# Patient Record
Sex: FEMALE | Race: WHITE
[De-identification: names, ages, dates, MRNs, and addresses within clinical notes are randomized per-mention and may not be internally consistent; named-entity substitution may affect disease eponyms.]

---

## 2017-06-07 ENCOUNTER — HOSPITAL ENCOUNTER (OUTPATIENT)
Dept: HOSPITAL 80 - FIMAGING | Age: 52
End: 2017-06-07
Attending: OBSTETRICS & GYNECOLOGY
Payer: COMMERCIAL

## 2017-06-07 DIAGNOSIS — Z12.31: Primary | ICD-10-CM

## 2017-06-07 PROCEDURE — G0202 SCR MAMMO BI INCL CAD: HCPCS

## 2019-02-11 ENCOUNTER — HOSPITAL ENCOUNTER (OUTPATIENT)
Dept: HOSPITAL 80 - FIMAGING | Age: 54
End: 2019-02-11
Attending: OBSTETRICS & GYNECOLOGY
Payer: COMMERCIAL

## 2019-02-11 DIAGNOSIS — Z12.31: Primary | ICD-10-CM

## 2019-03-13 ENCOUNTER — HOSPITAL ENCOUNTER (OUTPATIENT)
Dept: HOSPITAL 80 - FCATH | Age: 54
Discharge: HOME | End: 2019-03-13
Attending: INTERNAL MEDICINE
Payer: COMMERCIAL

## 2019-03-13 DIAGNOSIS — I25.10: Primary | ICD-10-CM

## 2019-03-13 DIAGNOSIS — E10.9: ICD-10-CM

## 2019-03-13 DIAGNOSIS — E03.9: ICD-10-CM

## 2019-03-13 DIAGNOSIS — E78.00: ICD-10-CM

## 2019-03-13 DIAGNOSIS — Z82.49: ICD-10-CM

## 2019-03-13 LAB
INR PPP: 1.1 (ref 0.83–1.16)
PLATELET # BLD: 237 10^3/UL (ref 150–400)
PROTHROMBIN TIME: 13.8 SEC (ref 12–15)

## 2019-03-13 PROCEDURE — B2111ZZ FLUOROSCOPY OF MULTIPLE CORONARY ARTERIES USING LOW OSMOLAR CONTRAST: ICD-10-PCS | Performed by: INTERNAL MEDICINE

## 2019-03-13 PROCEDURE — 4A023N7 MEASUREMENT OF CARDIAC SAMPLING AND PRESSURE, LEFT HEART, PERCUTANEOUS APPROACH: ICD-10-PCS | Performed by: INTERNAL MEDICINE

## 2019-03-13 PROCEDURE — B2151ZZ FLUOROSCOPY OF LEFT HEART USING LOW OSMOLAR CONTRAST: ICD-10-PCS | Performed by: INTERNAL MEDICINE

## 2019-03-13 NOTE — CPIP
[f rep st]



                                                       INVASIVE CARDIAC PROCEDURE





DATE OF PROCEDURE:  03/13/2019



INDICATION FOR PROCEDURE:  Positive stress test, elevated coronary calcium score.



PROCEDURE:  

1.  Nonselective right groin sheathogram.

2.  Bilateral coronary angiography. 

3.  Left heart catheterization with left ventricular.



HISTORY:  Briefly this is a 53-year-old female with a history of elevated coronary calcium score with
 strong family history of coronary artery disease, as well as positive stress test.  Patient was cons
ented for left heart catheterization.



DESCRIPTION OF PROCEDURE:  After informed consent, the patient was brought to [facility] where the right gunnar
in was prepped and draped in sterile fashion.  Using lidocaine, a short 6-Bengali sheath in the right 
common femoral artery verified angiographically. Through this 6-Bengali sheath, a JL4 catheter was adv
anced.  



Images of the left coronary artery revealed normal left main.  Left circumflex artery was small with 
mild plaque disease.  There was a ramus intermedius, which had 30% tubular lesion in its midportion. 
 The LAD was a long vessel, which wrapped around the apex.  The LAD gave off a medium size diagonal a
rtery coming off the midportion.  There was 20% to 30% disease in the midportion of the LAD after the
 takeoff of the diagonal artery.  After these images were obtained, the JL4 catheter was removed.  



The JR4 catheter was advanced to the right coronary artery.  Images of the right coronary artery reve
aled normal os, prox, mid RCA.  Distal RPD and RPLS appeared to be healthy and free of disease.  Afte
r these images were obtained, the JR4 catheter was removed.  



Pigtail catheter advanced to left ventricle.  LVEDP is 12 mmHg.  Left ventriculogram in the WALKER proje
ction showed an EF of 50% to 55% with no wall motion abnormalities.  There was no pullback gradient b
etween the LV and the aorta.  The pigtail catheter was removed over an 0.035 wire.  Right groin was c
losed with manual pressure.  Patient tolerated the procedure well with no complications.



IMPRESSION:  

1.  Non-critical mild coronary artery disease, mainly in the form of 30% mid ramus, as well as 20% to
 30% mid left anterior descending.

2.  Low normal ejection fraction.



PLAN:  The patient will remain on her aggressive medical therapy, which includes insulin pump, statin
, and baby aspirin.  She will be discharged home today after 3 hours.  Will follow up in the office i
n 1 week's time.





Job #:  [id number]

## 2019-03-15 NOTE — CPEKG
Test Reason : OPEN

Blood Pressure : ***/*** mmHG

Vent. Rate : 067 BPM     Atrial Rate : 066 BPM

   P-R Int : 159 ms          QRS Dur : 093 ms

    QT Int : 400 ms       P-R-T Axes : 078 069 046 degrees

   QTc Int : 423 ms

 

Sinus rhythm

Low voltage, precordial leads

 

Confirmed by Avinash Wall ([id number]) on 3/15/2019 2:37:15 PM

 

Referred By: Larry Buckley           Confirmed By:Avinash Wall

## 2019-09-26 NOTE — PDHPUP
History & Physical Update


H&P update statement: 


This history and physical update is based on an assessment of the patient which 

was completed after admission or registration (within 24 hours), but prior to 

the surgery/procedure.





H&P update: H&P reviewed & patient examined, no change in patient's condition 

since H&P completed
no